# Patient Record
Sex: FEMALE | URBAN - METROPOLITAN AREA
[De-identification: names, ages, dates, MRNs, and addresses within clinical notes are randomized per-mention and may not be internally consistent; named-entity substitution may affect disease eponyms.]

---

## 2017-05-31 ENCOUNTER — APPOINTMENT (OUTPATIENT)
Age: 67
Setting detail: DERMATOLOGY
End: 2017-05-31

## 2017-05-31 VITALS — WEIGHT: 180 LBS | HEIGHT: 62 IN

## 2017-05-31 DIAGNOSIS — L10.0 PEMPHIGUS VULGARIS: ICD-10-CM

## 2017-05-31 PROBLEM — E13.9 OTHER SPECIFIED DIABETES MELLITUS WITHOUT COMPLICATIONS: Status: ACTIVE | Noted: 2017-05-31

## 2017-05-31 PROCEDURE — OTHER PRESCRIPTION: OTHER

## 2017-05-31 PROCEDURE — 99213 OFFICE O/P EST LOW 20 MIN: CPT

## 2017-05-31 PROCEDURE — OTHER COUNSELING: OTHER

## 2017-05-31 PROCEDURE — OTHER MIPS QUALITY: OTHER

## 2017-05-31 RX ORDER — TRIAMCINOLONE ACETONIDE 1 MG/G
1 PASTE TOPICAL BID
Qty: 5 | Refills: 5 | Status: ERX | COMMUNITY
Start: 2017-05-31

## 2017-05-31 ASSESSMENT — LOCATION SIMPLE DESCRIPTION DERM
LOCATION SIMPLE: ANTERIOR TONGUE
LOCATION SIMPLE: LEFT LATERAL TONGUE

## 2017-05-31 ASSESSMENT — LOCATION ZONE DERM: LOCATION ZONE: MUCOUS_MEMBRANE

## 2017-05-31 ASSESSMENT — PAIN INTENSITY VAS: HOW INTENSE IS YOUR PAIN 0 BEING NO PAIN, 10 BEING THE MOST SEVERE PAIN POSSIBLE?: NO PAIN

## 2017-05-31 ASSESSMENT — LOCATION DETAILED DESCRIPTION DERM
LOCATION DETAILED: LEFT LATERAL TONGUE
LOCATION DETAILED: RIGHT ANTERIOR TONGUE

## 2017-05-31 ASSESSMENT — SEVERITY ASSESSMENT: SEVERITY: MILD

## 2017-05-31 ASSESSMENT — BSA RASH: BSA RASH: 10

## 2017-05-31 NOTE — PROCEDURE: MIPS QUALITY
Quality 47: Advance Care Plan: Advance Care Planning discussed and documented in the medical record; patient did not wish or was not able to name a surrogate decision maker or provide an advance care plan.
Quality 111:Pneumonia Vaccination Status For Older Adults: Pneumococcal Vaccination Previously Received
Quality 226: Preventive Care And Screening: Tobacco Use: Screening And Cessation Intervention: Patient screened for tobacco and never smoked
Quality 110: Preventive Care And Screening: Influenza Immunization: Influenza Immunization Administered during Influenza season
Quality 130: Documentation Of Current Medications In The Medical Record: Current Medications Documented
Detail Level: Detailed

## 2017-06-26 ENCOUNTER — RX ONLY (RX ONLY)
Age: 67
End: 2017-06-26

## 2017-06-26 RX ORDER — PREDNISONE 2 MG/1
1 TABLET, DELAYED RELEASE ORAL QOHS
Qty: 30 | Refills: 3 | Status: ERX

## 2017-06-26 RX ORDER — PREDNISONE 1 MG/1
1 TABLET ORAL BID
Qty: 60 | Refills: 2 | Status: ERX | COMMUNITY
Start: 2017-06-26

## 2017-07-26 ENCOUNTER — APPOINTMENT (OUTPATIENT)
Age: 67
Setting detail: DERMATOLOGY
End: 2017-07-26

## 2017-07-26 VITALS — HEART RATE: 52 BPM | WEIGHT: 180 LBS | HEIGHT: 62 IN

## 2017-07-26 DIAGNOSIS — L10.0 PEMPHIGUS VULGARIS: ICD-10-CM

## 2017-07-26 PROCEDURE — OTHER MIPS QUALITY: OTHER

## 2017-07-26 PROCEDURE — 99213 OFFICE O/P EST LOW 20 MIN: CPT

## 2017-07-26 PROCEDURE — OTHER COUNSELING: OTHER

## 2017-07-26 PROCEDURE — OTHER TREATMENT REGIMEN: OTHER

## 2017-07-26 ASSESSMENT — LOCATION DETAILED DESCRIPTION DERM
LOCATION DETAILED: LEFT SUPERIOR VERMILION LIP
LOCATION DETAILED: LEFT LATERAL TONGUE
LOCATION DETAILED: RIGHT ANTERIOR TONGUE

## 2017-07-26 ASSESSMENT — LOCATION ZONE DERM
LOCATION ZONE: MUCOUS_MEMBRANE
LOCATION ZONE: LIP

## 2017-07-26 ASSESSMENT — LOCATION SIMPLE DESCRIPTION DERM
LOCATION SIMPLE: ANTERIOR TONGUE
LOCATION SIMPLE: LEFT LATERAL TONGUE
LOCATION SIMPLE: LEFT LIP

## 2017-07-26 NOTE — PROCEDURE: TREATMENT REGIMEN
Continue Regimen: Triamcinolone dental paste prn for flares\\nTaper instructions given today, taper prednisone to every 2nd day for a month, then every 3rd day for a month, then every 4th day for a month, then once a week for a month.\\nInfusion treatment with 's office.
Detail Level: Detailed

## 2017-07-26 NOTE — PROCEDURE: MIPS QUALITY
Quality 130: Documentation Of Current Medications In The Medical Record: Current Medications Documented
Quality 47: Advance Care Plan: Advance Care Planning discussed and documented in the medical record; patient did not wish or was not able to name a surrogate decision maker or provide an advance care plan.
Quality 110: Preventive Care And Screening: Influenza Immunization: Influenza Immunization Administered during Influenza season
Quality 226: Preventive Care And Screening: Tobacco Use: Screening And Cessation Intervention: Patient screened for tobacco and never smoked
Detail Level: Detailed
Quality 111:Pneumonia Vaccination Status For Older Adults: Pneumococcal Vaccination Previously Received

## 2022-02-07 ENCOUNTER — APPOINTMENT (OUTPATIENT)
Dept: URBAN - METROPOLITAN AREA CLINIC 250 | Age: 72
Setting detail: DERMATOLOGY
End: 2022-02-15

## 2022-02-07 DIAGNOSIS — Z79.899 OTHER LONG TERM (CURRENT) DRUG THERAPY: ICD-10-CM

## 2022-02-07 DIAGNOSIS — L10.0 PEMPHIGUS VULGARIS: ICD-10-CM

## 2022-02-07 DIAGNOSIS — L85.3 XEROSIS CUTIS: ICD-10-CM

## 2022-02-07 PROBLEM — E13.9 OTHER SPECIFIED DIABETES MELLITUS WITHOUT COMPLICATIONS: Status: ACTIVE | Noted: 2022-02-07

## 2022-02-07 PROCEDURE — OTHER MIPS QUALITY: OTHER

## 2022-02-07 PROCEDURE — OTHER PRESCRIPTION: OTHER

## 2022-02-07 PROCEDURE — OTHER INTENSIVE DRUG MONITORING FOR TOXICITY: OTHER

## 2022-02-07 PROCEDURE — 99213 OFFICE O/P EST LOW 20 MIN: CPT

## 2022-02-07 PROCEDURE — OTHER HIGH RISK MEDICATION MONITORING: OTHER

## 2022-02-07 PROCEDURE — OTHER COUNSELING: OTHER

## 2022-02-07 PROCEDURE — OTHER PRESCRIPTION MEDICATION MANAGEMENT: OTHER

## 2022-02-07 RX ORDER — DAPSONE 25 MG/1
TABLET ORAL
Qty: 90 | Refills: 1 | Status: ERX | COMMUNITY
Start: 2022-02-07

## 2022-02-07 ASSESSMENT — LOCATION SIMPLE DESCRIPTION DERM
LOCATION SIMPLE: LEFT ELBOW
LOCATION SIMPLE: LEFT SUPERIOR LIP

## 2022-02-07 ASSESSMENT — LOCATION ZONE DERM
LOCATION ZONE: LIP
LOCATION ZONE: ARM

## 2022-02-07 ASSESSMENT — SEVERITY ASSESSMENT: SEVERITY: MILD

## 2022-02-07 ASSESSMENT — LOCATION DETAILED DESCRIPTION DERM
LOCATION DETAILED: LEFT SUPERIOR VERMILION LIP
LOCATION DETAILED: LEFT ELBOW

## 2022-02-07 NOTE — PROCEDURE: COUNSELING
Detail Level: Zone
Patient Specific Counseling (Will Not Stick From Patient To Patient): Patient is use OTC CeraVe
Detail Level: Detailed

## 2022-02-07 NOTE — PROCEDURE: INTENSIVE DRUG MONITORING FOR TOXICITY
Drug Being Monitored: rituximab
Explanation (Does Not Render In The Note): A drug that requires intensive monitoring is a therapeutic agent that has the potential to cause serious morbidity or death. The monitoring is performed for assessment of these adverse effects and not primarily for assessment of therapeutic efficacy. The monitoring should be that which is generally accepted practice for the agent, but may be patient specific in some cases. Monitoring by history or examination does not qualify. Examples of monitoring that does not qualify include monitoring glucose levels during insulin therapy as the primary reason is the therapeutic effect or annual electrolytes and renal function for a patient on a diuretic as the frequency does not meet the threshold.

## 2022-02-07 NOTE — PROCEDURE: PRESCRIPTION MEDICATION MANAGEMENT
Continue Regimen: Continue dapsone 25mg once a day \\nCont. rituximab once a year
Render In Strict Bullet Format?: No
Detail Level: Simple

## 2022-02-07 NOTE — PROCEDURE: HIGH RISK MEDICATION MONITORING
Topical Ketoconazole Counseling: Patient counseled that this medication may cause skin irritation or allergic reactions.  In the event of skin irritation, the patient was advised to reduce the amount of the drug applied or use it less frequently.   The patient verbalized understanding of the proper use and possible adverse effects of ketoconazole.  All of the patient's questions and concerns were addressed.

## 2022-02-07 NOTE — PROCEDURE: HIGH RISK MEDICATION MONITORING
Azelaic Acid Pregnancy And Lactation Text: This medication is considered safe during pregnancy and breast feeding.

## 2022-02-07 NOTE — PROCEDURE: HIGH RISK MEDICATION MONITORING
Azelaic Acid Counseling: Patient counseled that medicine may cause skin irritation and to avoid applying near the eyes.  In the event of skin irritation, the patient was advised to reduce the amount of the drug applied or use it less frequently.   The patient verbalized understanding of the proper use and possible adverse effects of azelaic acid.  All of the patient's questions and concerns were addressed.

## 2022-02-15 RX ORDER — RITUXIMAB 10 MG/ML
INJECTION, SOLUTION INTRAVENOUS
Qty: 50 | Refills: 0 | COMMUNITY
Start: 2022-02-14

## 2022-02-15 RX ORDER — DIPHENHYDRAMINE HYDROCHLORIDE 50 MG/ML
INJECTION INTRAMUSCULAR; INTRAVENOUS
Qty: 1 | Refills: 0 | COMMUNITY
Start: 2022-02-14

## 2022-02-17 RX ORDER — DIPHENHYDRAMINE HYDROCHLORIDE 50 MG/ML
INJECTION INTRAMUSCULAR; INTRAVENOUS
Qty: 1 | Refills: 0

## 2022-02-17 RX ORDER — RITUXIMAB 10 MG/ML
INJECTION, SOLUTION INTRAVENOUS
Qty: 50 | Refills: 0

## 2022-03-07 ENCOUNTER — RX ONLY (RX ONLY)
Age: 72
End: 2022-03-07

## 2022-03-07 RX ORDER — DAPSONE 25 MG/1
TABLET ORAL
Qty: 30 | Refills: 5 | Status: ERX

## 2022-09-22 ENCOUNTER — APPOINTMENT (OUTPATIENT)
Dept: URBAN - METROPOLITAN AREA CLINIC 250 | Age: 72
Setting detail: DERMATOLOGY
End: 2022-10-01

## 2022-09-22 ENCOUNTER — RX ONLY (RX ONLY)
Age: 72
End: 2022-09-22

## 2022-09-22 DIAGNOSIS — L10.0 PEMPHIGUS VULGARIS: ICD-10-CM

## 2022-09-22 PROCEDURE — OTHER ORDER TESTS: OTHER

## 2022-09-22 PROCEDURE — 99215 OFFICE O/P EST HI 40 MIN: CPT

## 2022-09-22 PROCEDURE — OTHER COUNSELING: OTHER

## 2022-09-22 PROCEDURE — OTHER INTENSIVE DRUG MONITORING FOR TOXICITY: OTHER

## 2022-09-22 PROCEDURE — OTHER PRESCRIPTION MEDICATION MANAGEMENT: OTHER

## 2022-09-22 PROCEDURE — OTHER MIPS QUALITY: OTHER

## 2022-09-22 ASSESSMENT — LOCATION SIMPLE DESCRIPTION DERM: LOCATION SIMPLE: LEFT SUPERIOR LIP

## 2022-09-22 ASSESSMENT — LOCATION ZONE DERM: LOCATION ZONE: LIP

## 2022-09-22 ASSESSMENT — LOCATION DETAILED DESCRIPTION DERM: LOCATION DETAILED: LEFT SUPERIOR VERMILION LIP

## 2022-09-22 NOTE — PROCEDURE: PRESCRIPTION MEDICATION MANAGEMENT
Render In Strict Bullet Format?: No
Detail Level: Simple
Continue Regimen: Continue dapsone 25mg once a day \\nContinue rituximab 50mg IV infusions once a year

## 2023-01-19 ENCOUNTER — RX ONLY (RX ONLY)
Age: 73
End: 2023-01-19

## 2023-01-19 RX ORDER — DAPSONE 25 MG/1
TABLET ORAL QD
Qty: 30 | Refills: 5 | Status: CANCELLED

## 2023-01-19 RX ORDER — DAPSONE 25 MG/1
TABLET ORAL
Qty: 30 | Refills: 5 | Status: ERX

## 2023-03-22 ENCOUNTER — APPOINTMENT (OUTPATIENT)
Dept: URBAN - METROPOLITAN AREA CLINIC 250 | Age: 73
Setting detail: DERMATOLOGY
End: 2023-03-26

## 2023-03-22 ENCOUNTER — RX ONLY (RX ONLY)
Age: 73
End: 2023-03-22

## 2023-03-22 DIAGNOSIS — L10.0 PEMPHIGUS VULGARIS: ICD-10-CM

## 2023-03-22 PROBLEM — K75.9 INFLAMMATORY LIVER DISEASE, UNSPECIFIED: Status: ACTIVE | Noted: 2023-03-22

## 2023-03-22 PROBLEM — I10 ESSENTIAL (PRIMARY) HYPERTENSION: Status: ACTIVE | Noted: 2023-03-22

## 2023-03-22 PROCEDURE — OTHER COUNSELING: OTHER

## 2023-03-22 PROCEDURE — OTHER MIPS QUALITY: OTHER

## 2023-03-22 PROCEDURE — OTHER ORDER TESTS: OTHER

## 2023-03-22 PROCEDURE — 99214 OFFICE O/P EST MOD 30 MIN: CPT

## 2023-03-22 PROCEDURE — OTHER PRESCRIPTION MEDICATION MANAGEMENT: OTHER

## 2023-03-22 RX ORDER — DAPSONE 25 MG/1
TABLET ORAL
Qty: 30 | Refills: 5 | Status: ACTIVE

## 2023-03-22 NOTE — PROCEDURE: PRESCRIPTION MEDICATION MANAGEMENT
Continue Regimen: Continue dapsone 25mg once a day
Render In Strict Bullet Format?: No
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
Detail Level: Simple

## 2023-09-25 ENCOUNTER — APPOINTMENT (OUTPATIENT)
Dept: URBAN - METROPOLITAN AREA CLINIC 250 | Age: 73
Setting detail: DERMATOLOGY
End: 2023-09-26

## 2023-09-25 DIAGNOSIS — L10.0 PEMPHIGUS VULGARIS: ICD-10-CM

## 2023-09-25 PROCEDURE — OTHER COUNSELING: OTHER

## 2023-09-25 PROCEDURE — OTHER ORDER TESTS: OTHER

## 2023-09-25 PROCEDURE — 99213 OFFICE O/P EST LOW 20 MIN: CPT

## 2023-09-25 PROCEDURE — OTHER PRESCRIPTION MEDICATION MANAGEMENT: OTHER

## 2023-09-25 PROCEDURE — OTHER MIPS QUALITY: OTHER

## 2023-09-25 ASSESSMENT — LOCATION SIMPLE DESCRIPTION DERM
LOCATION SIMPLE: RIGHT SUPERIOR GINGIVAE
LOCATION SIMPLE: LEFT SUPERIOR GINGIVAE

## 2023-09-25 ASSESSMENT — LOCATION DETAILED DESCRIPTION DERM
LOCATION DETAILED: RIGHT SUPERIOR GINGIVAE
LOCATION DETAILED: LEFT SUPERIOR GINGIVAE

## 2023-09-25 ASSESSMENT — LOCATION ZONE DERM: LOCATION ZONE: MUCOUS_MEMBRANE

## 2023-09-25 NOTE — PROCEDURE: MIPS QUALITY
03-May-2019 13:31
Quality 47: Advance Care Plan: Advance Care Planning discussed and documented; advance care plan or surrogate decision maker documented in the medical record.
Detail Level: Detailed
Quality 110: Preventive Care And Screening: Influenza Immunization: Influenza Immunization Administered during Influenza season
Quality 226: Preventive Care And Screening: Tobacco Use: Screening And Cessation Intervention: Patient screened for tobacco use and is an ex/non-smoker

## 2023-09-25 NOTE — PROCEDURE: PRESCRIPTION MEDICATION MANAGEMENT
Continue Regimen: Continue dapsone 25mg once a day
Render In Strict Bullet Format?: No
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
Detail Level: Simple

## 2023-12-18 ENCOUNTER — RX ONLY (RX ONLY)
Age: 73
End: 2023-12-18

## 2023-12-18 RX ORDER — DAPSONE 25 MG/1
1 TABLET ORAL QD
Qty: 30 | Refills: 5 | Status: ERX